# Patient Record
(demographics unavailable — no encounter records)

---

## 2025-06-18 NOTE — DISCUSSION/SUMMARY
[FreeTextEntry1] : # Health Maintenance - Cervical cancer screening: Pap smear obtained. - Breast cancer screening: Mammogram referral provided. - Colon cancer screening: Not indicated at this time. - Osteoporosis screening: Not indicated at this time. - STI screening: Bloodwork declined  # Vulvovaginal pruritis  - Vaginitis panel pending. Suspected yeast infection - Fluconazole prescribed pending results.   RTO annual or PRN

## 2025-06-18 NOTE — HISTORY OF PRESENT ILLNESS
[Vasectomy (partner)] : has a partner with a vasectomy [Y] : Positive pregnancy history [Menarche Age: ____] : age at menarche was [unfilled] [No] : Patient does not have concerns regarding sex [Currently Active] : currently active [Patient reported PAP Smear was normal] : Patient reported PAP Smear was normal [HIV test declined] : HIV test declined [Gonorrhea test offered] : Gonorrhea test offered [Chlamydia test offered] : Chlamydia test offered [Trichomonas test offered] : Trichomonas test offered [HPV test offered] : HPV test offered [Hepatitis B test declined] : Hepatitis B test declined [Hepatitis C test declined] : Hepatitis C test declined [TextBox_19] : Due [TextBox_25] : Not indicated at this time.  [TextBox_31] : Due. Remote history of HPV.  [TextBox_37] : Not indicated at this time.  [TextBox_43] : Not indicated at this time.  [LMPDate] : 6/04/25 [PGHxTotal] : 2 [[facility] [[facility] 2 [FreeTextEntry1] : 6/04/25

## 2025-06-18 NOTE — PHYSICAL EXAM
[Chaperoned Physical Exam] : A chaperone was present in the examining room during all aspects of the physical examination. [MA] : MA [FreeTextEntry2] : Migdalia Blas [Appropriately responsive] : appropriately responsive [Alert] : alert [No Acute Distress] : no acute distress [Regular Rate Rhythm] : regular rate rhythm [Soft] : soft [Non-tender] : non-tender [Non-distended] : non-distended [No HSM] : No HSM [No Lesions] : no lesions [No Mass] : no mass [Oriented x3] : oriented x3 [FreeTextEntry5] : Breathing comfortably on room air [Examination Of The Breasts] : a normal appearance [No Masses] : no breast masses were palpable [Labia Majora Erythema] : erythema [Labia Minora Erythema] : erythema [Normal] : normal [Uterine Adnexae] : normal